# Patient Record
Sex: FEMALE | Race: WHITE
[De-identification: names, ages, dates, MRNs, and addresses within clinical notes are randomized per-mention and may not be internally consistent; named-entity substitution may affect disease eponyms.]

---

## 2021-06-16 ENCOUNTER — HOSPITAL ENCOUNTER (INPATIENT)
Dept: HOSPITAL 12 - ER | Age: 86
LOS: 12 days | Discharge: HOME HEALTH | DRG: 885 | End: 2021-06-28
Attending: INTERNAL MEDICINE
Payer: MEDICARE

## 2021-06-16 VITALS — BODY MASS INDEX: 23.92 KG/M2 | HEIGHT: 62 IN | WEIGHT: 130 LBS

## 2021-06-16 DIAGNOSIS — F01.50: ICD-10-CM

## 2021-06-16 DIAGNOSIS — E78.5: ICD-10-CM

## 2021-06-16 DIAGNOSIS — E44.1: ICD-10-CM

## 2021-06-16 DIAGNOSIS — F33.3: Primary | ICD-10-CM

## 2021-06-16 DIAGNOSIS — E88.09: ICD-10-CM

## 2021-06-16 DIAGNOSIS — F02.80: ICD-10-CM

## 2021-06-16 DIAGNOSIS — D75.89: ICD-10-CM

## 2021-06-16 DIAGNOSIS — Z74.09: ICD-10-CM

## 2021-06-16 DIAGNOSIS — G93.41: ICD-10-CM

## 2021-06-16 DIAGNOSIS — Z86.73: ICD-10-CM

## 2021-06-16 DIAGNOSIS — Z20.822: ICD-10-CM

## 2021-06-16 DIAGNOSIS — G31.9: ICD-10-CM

## 2021-06-16 DIAGNOSIS — N39.0: ICD-10-CM

## 2021-06-16 DIAGNOSIS — M19.011: ICD-10-CM

## 2021-06-16 DIAGNOSIS — E03.9: ICD-10-CM

## 2021-06-16 DIAGNOSIS — N32.81: ICD-10-CM

## 2021-06-16 DIAGNOSIS — F41.9: ICD-10-CM

## 2021-06-16 DIAGNOSIS — Z90.2: ICD-10-CM

## 2021-06-16 DIAGNOSIS — G30.9: ICD-10-CM

## 2021-06-16 DIAGNOSIS — D68.59: ICD-10-CM

## 2021-06-16 LAB
APPEARANCE UR: CLEAR
BILIRUB UR QL STRIP: NEGATIVE
COLOR UR: YELLOW
DEPRECATED SQUAMOUS URNS QL MICRO: (no result) /HPF
GLUCOSE UR STRIP-MCNC: NEGATIVE MG/DL
HGB UR QL STRIP: NEGATIVE
KETONES UR STRIP-MCNC: (no result) MG/DL
LEUKOCYTE ESTERASE UR QL STRIP: (no result)
NITRITE UR QL STRIP: NEGATIVE
PH UR STRIP: 5.5 [PH] (ref 5–8)
RBC #/AREA URNS HPF: (no result) /HPF (ref 0–3)
SP GR UR STRIP: 1.02 (ref 1–1.03)
UROBILINOGEN UR STRIP-MCNC: 0.2 E.U./DL
WBC #/AREA URNS HPF: (no result) /HPF

## 2021-06-16 PROCEDURE — A4663 DIALYSIS BLOOD PRESSURE CUFF: HCPCS

## 2021-06-16 NOTE — NUR
Pt brought in by [address] [facility] from [facility] for medical 
clearance. No SOB or labored breathing, afebrile. No c/o chest pain/pressure. 
Bed in lowest position for safety precautions. Able to follow directions.

## 2021-06-16 NOTE — NUR
Pt. admitted to [facility] , under care of  and Dr. Junior Adams. 

Dx: psychosis, 5150 hold for danger to others. 

Belongs List completed

## 2021-06-17 VITALS — DIASTOLIC BLOOD PRESSURE: 43 MMHG | SYSTOLIC BLOOD PRESSURE: 125 MMHG

## 2021-06-17 VITALS — SYSTOLIC BLOOD PRESSURE: 122 MMHG | DIASTOLIC BLOOD PRESSURE: 36 MMHG

## 2021-06-17 VITALS — SYSTOLIC BLOOD PRESSURE: 115 MMHG | DIASTOLIC BLOOD PRESSURE: 48 MMHG

## 2021-06-17 LAB
CHOLEST SERPL-MCNC: 174 MG/DL (ref ?–200)
GLUCOSE SERPL-MCNC: 110 MG/DL (ref 70–115)
HDLC SERPL-MCNC: 44 MG/DL (ref 40–60)
TRIGL SERPL-MCNC: 138 MG/DL (ref 30–150)

## 2021-06-17 RX ADMIN — ACETAMINOPHEN PRN MG: 325 TABLET ORAL at 21:04

## 2021-06-17 RX ADMIN — ATORVASTATIN CALCIUM SCH MG: 10 TABLET, FILM COATED ORAL at 21:04

## 2021-06-17 NOTE — NUR
RECEIVED REPORT FROM ER NURSE GERRY PT IS ALERT AND ORIENTED X2 HAS A HX OF  
ALZHEIMER'S, TIA,OVERACTIVE BLADDER,HYPERLIPIDEMIA,DEMENTIA,MAJOR DEPRESSION,AND 
HYPOTHYROIDISM.PT WAS TRANSFERRED FROM [facility]  DUE TO ACUTE PSYCHOSIS AND ON 5150 
BECAUSE SHE WAS GOING TO STAB HER .  PT IS ALLERGIC TO SULFA ANTIBIOTIC, ADHESIVE 
TAPE,,ATIVAN, OLANZAPINE,QUETIAPINE.  ONCE PT ARRIVED ON UNIT STARTED FIGHTING STAFF AND 
STATED "I AM NOT STAYING HERE'. DR Fabienne ABREU WAS CALLED ORDERED 5MG OF GEODON IM AND PT IS 
NOW SITTING IN A RECLINER TOLERATED IM INJECTION NOW PT IS SLEEPING NO SIGNS OF DISTRESS PT 
IS CALM NO SIGNS OF AGITATION NOTED.  PT HAS GLASSES.  WILL CONTINUE TO MONITOR FOR SAFETY 
AND CHANGES IN PT BEHAVIOR.

## 2021-06-17 NOTE — NUR
CITLALY Initial Discharge Plan:

Patient resides at home with her  Richi Cedeno ([phone]) [address] [place] [address] . Patient's son/DPOA Channing Cedeno ([phone]) is involved in the 
patient's care. Patient will return home upon discharge. CITLALY will continue to work with 
patient, family, and MD to ensure a safe and proper discharge plan.

## 2021-06-17 NOTE — NUR
Firearms Report:

 completed and submitted a [employer] firearms report for 5150 danger to others 
certifications. A copy of report has been placed in patient chart.

## 2021-06-17 NOTE — NUR
RECEIVED PT CALM AT THIS TIME NO SIGNS OF DISTRESS NOTED PT WAS GIVEN HS MEDICATION NO SIGNS 
OF RESPIRATORY DISTRESS NOTED.  WILL CONTINUE TO MONITOR FOR SAFETY AND OTHER CHANGES PT MAY 
HAVE.

## 2021-06-17 NOTE — NUR
SW Family Contact:



SW spoke with patient's son/DPOA Channing Wilian ([phone]) who is involved in the patient's 
care. Channing provided collateral information and this SW discussed treatment and discharge 
plan. Chnaning will be bringing in the DPOA documents this afternoon.

## 2021-06-17 NOTE — NUR
PATIENT IS NOTED CALM AT THIS TIME. CHEMICAL RESTRAINT WAS EFFECTIVE.

PT WAS GIVEN HER ADVISEMENT AS WELL AS HER BOOKLET FOR "PATIENT RIGHTS IN MENTAL HEALTH 
FACILITIES". [place] IS NOTED TO REFLECT WAS IS WRITTEN IN HER HOLD. SAFETY AND FALL PRECAUTION 
IN PLACE. WILL CONTINUE TO MONITOR,

## 2021-06-17 NOTE — NUR
UR NOTE:



Per GG Intake:



[facility].  Hal Khalil ([phone]) is the Care MGR.  Auth# [id number] 
obtained 14 days approved.

## 2021-06-17 NOTE — NUR
CITLALY Family Meeting:



CITLALY met with patient's son Channing Cedeno ([phone]) and patient's daughter Tierra.tran Snell 
provided the DPOA documents and the copy has been placed in the patient's chart. CITLALY 
discussed treatment plan with both Channing and Tierra as well as medications, therapy, etc. 
Channing requested to speak with Dr. Ramirez. SW endorsed this to Dr. Ramirez.

## 2021-06-18 VITALS — SYSTOLIC BLOOD PRESSURE: 126 MMHG | DIASTOLIC BLOOD PRESSURE: 81 MMHG

## 2021-06-18 VITALS — SYSTOLIC BLOOD PRESSURE: 123 MMHG | DIASTOLIC BLOOD PRESSURE: 72 MMHG

## 2021-06-18 VITALS — DIASTOLIC BLOOD PRESSURE: 55 MMHG | SYSTOLIC BLOOD PRESSURE: 120 MMHG

## 2021-06-18 LAB
ALP SERPL-CCNC: 71 U/L (ref 50–136)
ALT SERPL W/O P-5'-P-CCNC: 15 U/L (ref 14–59)
AST SERPL-CCNC: 11 U/L (ref 15–37)
BILIRUB SERPL-MCNC: 0.3 MG/DL (ref 0.2–1)
BUN SERPL-MCNC: 23 MG/DL (ref 7–18)
CHLORIDE SERPL-SCNC: 108 MMOL/L (ref 98–107)
CO2 SERPL-SCNC: 27 MMOL/L (ref 21–32)
CREAT SERPL-MCNC: 1.1 MG/DL (ref 0.6–1.3)
GLUCOSE SERPL-MCNC: 90 MG/DL (ref 74–106)
HCT VFR BLD AUTO: 35.8 % (ref 31.2–41.9)
MAGNESIUM SERPL-MCNC: 2.3 MG/DL (ref 1.8–2.4)
MCH RBC QN AUTO: 32.6 UUG (ref 24.7–32.8)
MCV RBC AUTO: 98 FL (ref 75.5–95.3)
PHOSPHATE SERPL-MCNC: 2.6 MG/DL (ref 2.5–4.9)
PLATELET # BLD AUTO: 223 K/UL (ref 179–408)
POTASSIUM SERPL-SCNC: 4.3 MMOL/L (ref 3.5–5.1)
TSH SERPL DL<=0.005 MIU/L-ACNC: 2.62 MIU/ML (ref 0.36–3.74)
WS STN SPEC: 6.9 G/DL (ref 6.4–8.2)

## 2021-06-18 RX ADMIN — ACETAMINOPHEN PRN MG: 325 TABLET ORAL at 13:19

## 2021-06-18 RX ADMIN — OXCARBAZEPINE SCH MG: 150 TABLET, FILM COATED ORAL at 09:38

## 2021-06-18 RX ADMIN — OXCARBAZEPINE SCH MG: 150 TABLET, FILM COATED ORAL at 13:19

## 2021-06-18 RX ADMIN — Medication SCH MCG: at 06:21

## 2021-06-18 RX ADMIN — OXCARBAZEPINE SCH MG: 150 TABLET, FILM COATED ORAL at 17:36

## 2021-06-18 RX ADMIN — ATORVASTATIN CALCIUM SCH MG: 10 TABLET, FILM COATED ORAL at 20:40

## 2021-06-18 NOTE — NUR
CITLALY Family Contact:



CITLALY received a two voicemails from patient's son Channing Cedeno ([phone]). SW called Channing 
and spoke with him regarding patient's updated medications. Channing was concerned about patient 
not being ambulatory oe active enough. SW informed that physical therapy has met with 
patient and will be conducting therapy daily.

## 2021-06-18 NOTE — NUR
NURSING ASSISTANT CHECKED ON PT SHE I S COMBATIVE AND  AGITATED  CALLED SECURITY  ASSISTS 
STAFF PUT PT IN GERICHAIR AFTERWARDS. DR. ABREU WAS CALLED SHE ORDERED PT TO HAVE A ONE 
TIME DOSE OF 5MG GEODON IM PT WAS GIVEN DOSAGE BUT STILL COMBATIVE. PT IS SITING WITH AT 
NURSES STATION WILL MONITOR EFFECT OF MEDICATION IN 30. NO SIGNS OF DISTRESS NOTED.

## 2021-06-18 NOTE — NUR
CITLALY Note:



Per family request, CITLALY informed Dr. Ramirez they would like to speak to her and requested 
for a call to Channing patient's son/DPOA ([phone]).

## 2021-06-19 VITALS — SYSTOLIC BLOOD PRESSURE: 120 MMHG | DIASTOLIC BLOOD PRESSURE: 53 MMHG

## 2021-06-19 VITALS — SYSTOLIC BLOOD PRESSURE: 110 MMHG | DIASTOLIC BLOOD PRESSURE: 45 MMHG

## 2021-06-19 VITALS — DIASTOLIC BLOOD PRESSURE: 52 MMHG | SYSTOLIC BLOOD PRESSURE: 115 MMHG

## 2021-06-19 RX ADMIN — OXCARBAZEPINE SCH MG: 150 TABLET, FILM COATED ORAL at 08:55

## 2021-06-19 RX ADMIN — OXCARBAZEPINE SCH MG: 150 TABLET, FILM COATED ORAL at 13:05

## 2021-06-19 RX ADMIN — Medication SCH MCG: at 06:22

## 2021-06-19 RX ADMIN — ATORVASTATIN CALCIUM SCH MG: 10 TABLET, FILM COATED ORAL at 21:10

## 2021-06-19 RX ADMIN — Medication SCH EA: at 21:10

## 2021-06-19 RX ADMIN — OXCARBAZEPINE SCH MG: 150 TABLET, FILM COATED ORAL at 17:41

## 2021-06-19 NOTE — NUR
PATIENTS FAMILY HERE TO VISIT AND ASSISTING HER WITH AMBULATING IN THE HALLWAY WITH THE 
FRONT WHEEL WALKER WITHB SLOW STEADY GAIT COMPLIANT WITH MEDICATIONS AND CARE.

## 2021-06-19 NOTE — NUR
PATIENT ALERT CONFUSED, TALKING ABOUT GANGS AROUND HER, REORIENT PATIENT AND ASSISTED WITH 
TOILETING, PATIENT COOPERATIVE WITH MEDICATIONS AND CARE, CONT TO MONITOR. DR DUPONT HAS 
NEW ORDER OF RISPERDAL 0.25 MG PO PRN HS.

## 2021-06-19 NOTE — NUR
PATIENT IS AWAKE ALERT ORIENTED WITH FORGETFULNESS SHE IS COMPLIANT WITH MEDICATIONS AND 
CARE NO EPISODES OF AUDITORY OR VISUAL HALLUCINATIONS AT THIS TIME WILL CONTINUE TO PROVIDE 
SAFE AND THERAPEUTIC ENVIRONMENT AT ALL TIMES.

## 2021-06-19 NOTE — NUR
RECEIVED PT LYING ON BED PT IS COMPLIANT WITH MEDICATION BUT AFTERWARDS CONTINUE TO GET OUT 
OF BED FOR PT SAFETY PUT ON A GERICHAIR. PT IS CONFUSED STATES"i HAVE TO GO HOME COOK MY 
 DINNER AND GET HOME WITH THE KIDS" PT VOICE IS ESCALATING CALLED DR ABREU FOR 
MEDICATION.  WILL CONTINUE TO MONITOR.

## 2021-06-20 VITALS — SYSTOLIC BLOOD PRESSURE: 149 MMHG | DIASTOLIC BLOOD PRESSURE: 49 MMHG

## 2021-06-20 VITALS — SYSTOLIC BLOOD PRESSURE: 111 MMHG | DIASTOLIC BLOOD PRESSURE: 47 MMHG

## 2021-06-20 VITALS — DIASTOLIC BLOOD PRESSURE: 41 MMHG | SYSTOLIC BLOOD PRESSURE: 119 MMHG

## 2021-06-20 RX ADMIN — Medication SCH MCG: at 06:02

## 2021-06-20 RX ADMIN — MEMANTINE HYDROCHLORIDE SCH MG: 10 TABLET ORAL at 08:32

## 2021-06-20 RX ADMIN — Medication SCH EA: at 20:05

## 2021-06-20 RX ADMIN — MAGNESIUM HYDROXIDE PRN ML: 400 SUSPENSION ORAL at 10:34

## 2021-06-20 RX ADMIN — Medication SCH MG: at 08:32

## 2021-06-20 RX ADMIN — ATORVASTATIN CALCIUM SCH MG: 10 TABLET, FILM COATED ORAL at 20:09

## 2021-06-20 RX ADMIN — OXCARBAZEPINE SCH MG: 150 TABLET, FILM COATED ORAL at 08:32

## 2021-06-20 RX ADMIN — Medication SCH EA: at 08:33

## 2021-06-20 RX ADMIN — MEMANTINE HYDROCHLORIDE SCH MG: 10 TABLET ORAL at 20:09

## 2021-06-20 RX ADMIN — OXCARBAZEPINE SCH MG: 150 TABLET, FILM COATED ORAL at 16:14

## 2021-06-20 RX ADMIN — OXCARBAZEPINE SCH MG: 150 TABLET, FILM COATED ORAL at 12:36

## 2021-06-20 NOTE — NUR
ASSISTED PATIENT TO THE BATHROOM WITH THE FRONT WHEEL WALKER PATIENT HAS UNSTEADY GAIT ALERT 
BUT IS DISORIENTED NEEDED CONSTANT QUES TO STAY INSIDE THE WALKER AND TO STAND STRAIGHT 
DAUGHTER IS AT THE BEDSIDE PLACED BACK INTO THE LOLITA CHAIR MADE COMFORTABLE WILL CONTINUE TO 
OBSERVE.

## 2021-06-20 NOTE — NUR
Pt was attempt get out bed during the night md was called and afterwards pt took vistaril 
25mg but refused ambien will endorse to am nurse.

## 2021-06-20 NOTE — NUR
PATIENT MEDICATED WITH MILK OF MAGNESIA AS THERE IS NO DOCUMENTATION FOR A BOWEL MOVEMENT 
AND PATIENT IS UNABLE TO REMEMBER WHEN HE HAD A BM.

## 2021-06-20 NOTE — NUR
ASA/Dipyridamole (pt's own med) received from Pharmacy. Pt refused HS meds despite being 
offered multiple times, said "I am going to take it when I get home."

## 2021-06-21 VITALS — SYSTOLIC BLOOD PRESSURE: 132 MMHG | DIASTOLIC BLOOD PRESSURE: 54 MMHG

## 2021-06-21 VITALS — DIASTOLIC BLOOD PRESSURE: 50 MMHG | SYSTOLIC BLOOD PRESSURE: 136 MMHG

## 2021-06-21 RX ADMIN — MEMANTINE HYDROCHLORIDE SCH MG: 10 TABLET ORAL at 20:07

## 2021-06-21 RX ADMIN — OXCARBAZEPINE SCH MG: 150 TABLET, FILM COATED ORAL at 13:11

## 2021-06-21 RX ADMIN — OXCARBAZEPINE SCH MG: 150 TABLET, FILM COATED ORAL at 09:19

## 2021-06-21 RX ADMIN — Medication SCH EA: at 09:20

## 2021-06-21 RX ADMIN — Medication SCH MG: at 09:19

## 2021-06-21 RX ADMIN — Medication SCH MCG: at 06:22

## 2021-06-21 RX ADMIN — MEMANTINE HYDROCHLORIDE SCH MG: 10 TABLET ORAL at 09:18

## 2021-06-21 RX ADMIN — ATORVASTATIN CALCIUM SCH MG: 10 TABLET, FILM COATED ORAL at 20:07

## 2021-06-21 RX ADMIN — OXCARBAZEPINE SCH MG: 150 TABLET, FILM COATED ORAL at 17:23

## 2021-06-21 RX ADMIN — Medication SCH EA: at 20:07

## 2021-06-21 NOTE — NUR
CITLALY PC Hearing: 

Patient had 5250 probable cause hearing today and it was upheld for grave disability and 
danger to others.

## 2021-06-22 VITALS — DIASTOLIC BLOOD PRESSURE: 42 MMHG | SYSTOLIC BLOOD PRESSURE: 108 MMHG

## 2021-06-22 VITALS — DIASTOLIC BLOOD PRESSURE: 50 MMHG | SYSTOLIC BLOOD PRESSURE: 105 MMHG

## 2021-06-22 VITALS — DIASTOLIC BLOOD PRESSURE: 52 MMHG | SYSTOLIC BLOOD PRESSURE: 106 MMHG

## 2021-06-22 RX ADMIN — OXCARBAZEPINE SCH MG: 150 TABLET, FILM COATED ORAL at 16:35

## 2021-06-22 RX ADMIN — Medication SCH MG: at 08:32

## 2021-06-22 RX ADMIN — ASPIRIN SCH MG: 81 TABLET, CHEWABLE ORAL at 08:31

## 2021-06-22 RX ADMIN — OXCARBAZEPINE SCH MG: 150 TABLET, FILM COATED ORAL at 13:27

## 2021-06-22 RX ADMIN — Medication SCH EA: at 08:33

## 2021-06-22 RX ADMIN — MEMANTINE HYDROCHLORIDE SCH MG: 10 TABLET ORAL at 20:41

## 2021-06-22 RX ADMIN — Medication SCH EA: at 20:41

## 2021-06-22 RX ADMIN — OXCARBAZEPINE SCH MG: 150 TABLET, FILM COATED ORAL at 08:32

## 2021-06-22 RX ADMIN — Medication SCH MCG: at 06:06

## 2021-06-22 RX ADMIN — ATORVASTATIN CALCIUM SCH MG: 10 TABLET, FILM COATED ORAL at 20:41

## 2021-06-22 RX ADMIN — MEMANTINE HYDROCHLORIDE SCH MG: 10 TABLET ORAL at 08:32

## 2021-06-22 NOTE — NUR
CITLALY Family Contact:



CITLALY spoke with patient's son Channing Cedeno ([phone]) and rediscussed the medications 
patient is taking. Channing had some concerns and this  provided Delores Castro [facility] 
office number.

## 2021-06-22 NOTE — NUR
CITLALY Family Contact:



CITLALY met with patient's son Channing Cedeno ([phone]) and discussed updated treatment plan. 
SW provided updated medication list and patient's behaviors. SW provided information on 
Physical therapy treatments.

## 2021-06-22 NOTE — NUR
UR NOTE:





[facility].  Gail (P [phone] F [phone]) Auth# [id number] requested 
updated clinicals today. SW faxed updated clinicals including history and physical, 
medication list and labs, progress notes.

## 2021-06-22 NOTE — NUR
GPS: Remain calm and cooperative with meds and care. no agitation noted. watching tv in day 
room at this time.

## 2021-06-23 VITALS — DIASTOLIC BLOOD PRESSURE: 50 MMHG | SYSTOLIC BLOOD PRESSURE: 116 MMHG

## 2021-06-23 VITALS — SYSTOLIC BLOOD PRESSURE: 117 MMHG | DIASTOLIC BLOOD PRESSURE: 30 MMHG

## 2021-06-23 VITALS — SYSTOLIC BLOOD PRESSURE: 114 MMHG | DIASTOLIC BLOOD PRESSURE: 47 MMHG

## 2021-06-23 RX ADMIN — MEMANTINE HYDROCHLORIDE SCH MG: 10 TABLET ORAL at 20:56

## 2021-06-23 RX ADMIN — Medication SCH MG: at 10:56

## 2021-06-23 RX ADMIN — Medication SCH EA: at 20:58

## 2021-06-23 RX ADMIN — Medication SCH EA: at 10:54

## 2021-06-23 RX ADMIN — Medication SCH MCG: at 06:24

## 2021-06-23 RX ADMIN — OXCARBAZEPINE SCH MG: 150 TABLET, FILM COATED ORAL at 18:22

## 2021-06-23 RX ADMIN — OXCARBAZEPINE SCH MG: 150 TABLET, FILM COATED ORAL at 12:56

## 2021-06-23 RX ADMIN — ASPIRIN SCH MG: 81 TABLET, CHEWABLE ORAL at 10:55

## 2021-06-23 RX ADMIN — OXCARBAZEPINE SCH MG: 150 TABLET, FILM COATED ORAL at 10:55

## 2021-06-23 RX ADMIN — ATORVASTATIN CALCIUM SCH MG: 10 TABLET, FILM COATED ORAL at 20:56

## 2021-06-23 RX ADMIN — MEMANTINE HYDROCHLORIDE SCH MG: 10 TABLET ORAL at 10:56

## 2021-06-23 NOTE — NUR
Received patient in her room, med compliant, patient pleasant upon approach. Patient mostly 
stays in her room. Interacts with staff upon approach. Patient will remain in a psych 
facility for further evaluation and treatment.

## 2021-06-23 NOTE — NUR
SW Family Contact:



SW and Dr. Bush met with patient's son Channing Cedeno ([phone]) and discussed the 
treatment plan, discharge plan, patient's current condition in dept. Dr. Bush provided 
education regarding the above. Dr. Bush and this SW listened to Channing's concerns.

## 2021-06-23 NOTE — NUR
CITLALY Family Contact:



CITLALY and Dr. Ramirez spoke with patient's son Channing Cedeno ([phone]). Dr. Ramirez listened 
and addressed some of his concerns regarding medications, treatment plan, and discharge 
plan. Dr. Ramirez informed that the internist can provide insight if patient will require 
physial therapy at home. Dr. Ramirez addressed patient will need to follow up with her 
outpatient psychiatrist upon discharge. Dr. Ramirez discussed in dept the side effects of 
the current medications and the patient's current stable condition. Channing was understanding. 
Also discussed possible discharge home once stable this week.

## 2021-06-24 VITALS — SYSTOLIC BLOOD PRESSURE: 135 MMHG | DIASTOLIC BLOOD PRESSURE: 51 MMHG

## 2021-06-24 VITALS — SYSTOLIC BLOOD PRESSURE: 130 MMHG | DIASTOLIC BLOOD PRESSURE: 45 MMHG

## 2021-06-24 LAB
ALP SERPL-CCNC: 82 U/L (ref 50–136)
ALT SERPL W/O P-5'-P-CCNC: 18 U/L (ref 14–59)
AST SERPL-CCNC: 17 U/L (ref 15–37)
BILIRUB SERPL-MCNC: 0.1 MG/DL (ref 0.2–1)
BUN SERPL-MCNC: 18 MG/DL (ref 7–18)
CHLORIDE SERPL-SCNC: 101 MMOL/L (ref 98–107)
CO2 SERPL-SCNC: 27 MMOL/L (ref 21–32)
CREAT SERPL-MCNC: 0.9 MG/DL (ref 0.6–1.3)
GLUCOSE SERPL-MCNC: 104 MG/DL (ref 74–106)
HCT VFR BLD AUTO: 33.4 % (ref 31.2–41.9)
MAGNESIUM SERPL-MCNC: 2.2 MG/DL (ref 1.8–2.4)
MCH RBC QN AUTO: 32.7 UUG (ref 24.7–32.8)
MCV RBC AUTO: 97.4 FL (ref 75.5–95.3)
PLATELET # BLD AUTO: 224 K/UL (ref 179–408)
POTASSIUM SERPL-SCNC: 4 MMOL/L (ref 3.5–5.1)
WS STN SPEC: 7 G/DL (ref 6.4–8.2)

## 2021-06-24 RX ADMIN — Medication SCH MG: at 08:31

## 2021-06-24 RX ADMIN — Medication SCH EA: at 20:50

## 2021-06-24 RX ADMIN — Medication SCH EA: at 08:36

## 2021-06-24 RX ADMIN — MEMANTINE HYDROCHLORIDE SCH MG: 10 TABLET ORAL at 08:31

## 2021-06-24 RX ADMIN — Medication SCH MCG: at 06:12

## 2021-06-24 RX ADMIN — MEMANTINE HYDROCHLORIDE SCH MG: 10 TABLET ORAL at 20:48

## 2021-06-24 RX ADMIN — ASPIRIN SCH MG: 81 TABLET, CHEWABLE ORAL at 08:31

## 2021-06-24 RX ADMIN — OXCARBAZEPINE SCH MG: 150 TABLET, FILM COATED ORAL at 08:31

## 2021-06-24 RX ADMIN — ATORVASTATIN CALCIUM SCH MG: 10 TABLET, FILM COATED ORAL at 20:48

## 2021-06-24 NOTE — NUR
GPS: Nursing Notes: Thought Disorder:

Patient is awake and responding to her name, confused, disoriented, paranoid behavior, 
resistant with nursing care, poor impulse control, needs constant redirections, reoriented 
and redirected during shift, ambulatory with assistance/fww, believes that we are trying to 
poison her, loud anxious affect, unable to formulate a viable plan for self care, continue 
with treatment plan.

## 2021-06-25 VITALS — DIASTOLIC BLOOD PRESSURE: 55 MMHG | SYSTOLIC BLOOD PRESSURE: 125 MMHG

## 2021-06-25 VITALS — DIASTOLIC BLOOD PRESSURE: 63 MMHG | SYSTOLIC BLOOD PRESSURE: 125 MMHG

## 2021-06-25 LAB
APPEARANCE UR: CLEAR
BILIRUB UR QL STRIP: NEGATIVE
COLOR UR: YELLOW
DEPRECATED SQUAMOUS URNS QL MICRO: (no result) /HPF
GLUCOSE UR STRIP-MCNC: NEGATIVE MG/DL
HGB UR QL STRIP: NEGATIVE
KETONES UR STRIP-MCNC: NEGATIVE MG/DL
LEUKOCYTE ESTERASE UR QL STRIP: (no result)
NITRITE UR QL STRIP: NEGATIVE
PH UR STRIP: 6.5 [PH] (ref 5–8)
RBC #/AREA URNS HPF: (no result) /HPF (ref 0–3)
SP GR UR STRIP: 1.02 (ref 1–1.03)
UROBILINOGEN UR STRIP-MCNC: 0.2 E.U./DL
WBC #/AREA URNS HPF: (no result) /HPF
WBC #/AREA URNS HPF: (no result) /HPF (ref 0–3)

## 2021-06-25 RX ADMIN — MEMANTINE HYDROCHLORIDE SCH MG: 10 TABLET ORAL at 08:29

## 2021-06-25 RX ADMIN — MEMANTINE HYDROCHLORIDE SCH MG: 10 TABLET ORAL at 20:29

## 2021-06-25 RX ADMIN — MIRTAZAPINE SCH MG: 15 TABLET, FILM COATED ORAL at 20:29

## 2021-06-25 RX ADMIN — Medication SCH MG: at 08:29

## 2021-06-25 RX ADMIN — Medication SCH EA: at 20:30

## 2021-06-25 RX ADMIN — Medication SCH EA: at 08:32

## 2021-06-25 RX ADMIN — ASPIRIN SCH MG: 81 TABLET, CHEWABLE ORAL at 08:29

## 2021-06-25 RX ADMIN — ATORVASTATIN CALCIUM SCH MG: 10 TABLET, FILM COATED ORAL at 20:29

## 2021-06-25 RX ADMIN — Medication SCH MCG: at 06:01

## 2021-06-25 RX ADMIN — HALOPERIDOL SCH MG: 0.5 TABLET ORAL at 17:02

## 2021-06-25 RX ADMIN — HALOPERIDOL SCH MG: 0.5 TABLET ORAL at 15:09

## 2021-06-25 NOTE — NUR
UR NOTE:



CITLALY faxed patient's updated clinicals for review to [facility].  Gail (P 
[phone] F [phone]) Auth# [id number] and Joycelyn ([phone]).

## 2021-06-25 NOTE — NUR
CITLALY Family Contact:



CITLALY and Dr. Ramirez spoke with patient's son Channing Cedeno ([phone]). Dr. Ramirez listened 
and addressed some concerns Channing had regarding medications and the treatment plan. Dr. Ramirez discussed the updated treatment plan and Channing is agreeable.

## 2021-06-25 NOTE — NUR
UR NOTE:



SW spoke with [facility].  Lexi (P [phone] F [phone]) Auth# 
[id number] who confirmed patient is authorized.

## 2021-06-25 NOTE — NUR
RECEIVED REPORT FROM AM NURSE JESSICA PT IN ROOM NO  SIGNS OF DISTRESSED NOTED BUT GOT OF BED 
AND WAS PLACED ON A GERICHAIR AFTERWARDS GIVEN A SHOWER AND TOOK HS MEDICATION NO SIGNS OF 
ADVERSE MEDICATION NOTED.  WILL CONTINUE TO MONITOR WILL GIVE UPDATE TO AM NURSE.

## 2021-06-26 VITALS — SYSTOLIC BLOOD PRESSURE: 98 MMHG | DIASTOLIC BLOOD PRESSURE: 52 MMHG

## 2021-06-26 VITALS — SYSTOLIC BLOOD PRESSURE: 117 MMHG | DIASTOLIC BLOOD PRESSURE: 49 MMHG

## 2021-06-26 RX ADMIN — MIRTAZAPINE SCH MG: 15 TABLET, FILM COATED ORAL at 20:17

## 2021-06-26 RX ADMIN — Medication SCH EA: at 10:00

## 2021-06-26 RX ADMIN — ATORVASTATIN CALCIUM SCH MG: 10 TABLET, FILM COATED ORAL at 20:17

## 2021-06-26 RX ADMIN — Medication SCH EA: at 20:19

## 2021-06-26 RX ADMIN — MEMANTINE HYDROCHLORIDE SCH MG: 10 TABLET ORAL at 10:00

## 2021-06-26 RX ADMIN — HALOPERIDOL SCH MG: 0.5 TABLET ORAL at 12:21

## 2021-06-26 RX ADMIN — MEMANTINE HYDROCHLORIDE SCH MG: 10 TABLET ORAL at 20:17

## 2021-06-26 RX ADMIN — HALOPERIDOL SCH MG: 0.5 TABLET ORAL at 16:41

## 2021-06-26 RX ADMIN — ASPIRIN SCH MG: 81 TABLET, CHEWABLE ORAL at 10:00

## 2021-06-26 RX ADMIN — Medication SCH MCG: at 06:17

## 2021-06-26 NOTE — NUR
GPS: Remain calm and cooperative with care.  compliant with am meds. no agitation noted. 
assisted with adl's. no c/o pain or discomfort at this time. slept 7.30 hrs through the 
night. resting in bed comfortably.

## 2021-06-27 VITALS — DIASTOLIC BLOOD PRESSURE: 47 MMHG | SYSTOLIC BLOOD PRESSURE: 118 MMHG

## 2021-06-27 VITALS — DIASTOLIC BLOOD PRESSURE: 64 MMHG | SYSTOLIC BLOOD PRESSURE: 112 MMHG

## 2021-06-27 VITALS — SYSTOLIC BLOOD PRESSURE: 104 MMHG | DIASTOLIC BLOOD PRESSURE: 51 MMHG

## 2021-06-27 RX ADMIN — HALOPERIDOL SCH MG: 0.5 TABLET ORAL at 12:18

## 2021-06-27 RX ADMIN — Medication SCH EA: at 20:29

## 2021-06-27 RX ADMIN — MEMANTINE HYDROCHLORIDE SCH MG: 10 TABLET ORAL at 20:27

## 2021-06-27 RX ADMIN — Medication SCH MCG: at 06:14

## 2021-06-27 RX ADMIN — MEMANTINE HYDROCHLORIDE SCH MG: 10 TABLET ORAL at 10:23

## 2021-06-27 RX ADMIN — MIRTAZAPINE SCH MG: 15 TABLET, FILM COATED ORAL at 20:27

## 2021-06-27 RX ADMIN — ASPIRIN SCH MG: 81 TABLET, CHEWABLE ORAL at 10:23

## 2021-06-27 RX ADMIN — ATORVASTATIN CALCIUM SCH MG: 10 TABLET, FILM COATED ORAL at 20:27

## 2021-06-27 RX ADMIN — Medication SCH EA: at 10:23

## 2021-06-27 RX ADMIN — MAGNESIUM HYDROXIDE PRN ML: 400 SUSPENSION ORAL at 17:43

## 2021-06-27 RX ADMIN — ACETAMINOPHEN PRN MG: 325 TABLET ORAL at 16:13

## 2021-06-27 RX ADMIN — HALOPERIDOL SCH MG: 0.5 TABLET ORAL at 17:26

## 2021-06-28 VITALS — SYSTOLIC BLOOD PRESSURE: 104 MMHG | DIASTOLIC BLOOD PRESSURE: 51 MMHG

## 2021-06-28 RX ADMIN — MEMANTINE HYDROCHLORIDE SCH MG: 10 TABLET ORAL at 08:24

## 2021-06-28 RX ADMIN — HALOPERIDOL SCH MG: 0.5 TABLET ORAL at 12:29

## 2021-06-28 RX ADMIN — Medication SCH EA: at 08:25

## 2021-06-28 RX ADMIN — Medication SCH MCG: at 06:16

## 2021-06-28 RX ADMIN — ASPIRIN SCH MG: 81 TABLET, CHEWABLE ORAL at 08:23

## 2021-06-28 NOTE — NUR
This writer called in patient's prescriptions to [facility] 
([phone]). Spoke with Patrick, Pharmacist. Prescriptions given for Haldol 0.5 mg PO at 
1300 and 1700, and Remeron 7.5 mg PO Q HS.

## 2021-06-28 NOTE — NUR
UR NOTE:



CITLALY faxed [facility].  Lexi Mayorga (P [phone] F [phone]) Auth# 
[id number] discharged clinicals and request for Home Health Services.

## 2021-06-28 NOTE — NUR
GPS: Nursing Notes: Discharge Notes:

Patient is awake and responding to her name, cooperative with nursing care, compliant with 
her medications, participating in therapeutic groups, following staff directions, denies 
SI/HI, denies AH/VH, denies pain or discomfort at this time, denies SOB, discharge home with 
her  - Richi Cedeno and a daily caregiver at [id number] [place] [address]. Patient's son/DPOA - hCanning Cedeno [phone] picked up the patient, transported 
patient home via private vehicle, patient took all her belongings with her. Physician's 
order for Home Health was fax by  to [facility]. Patient will be 
following up with her psychiatrist Dr. Siddharth Basurto [address] [place] 
[address] (P [phone] F [phone]) and has an appointment scheduled on 6/28/21 2PM. 
Patient will be following up with her primary physician Dr. Ernie Sanders [address] [facility] 
[address], [place] [address] (P [phone] F [phone]) and has an appointment 
scheduled on 06/29/21 at 11:40AM.

## 2021-06-28 NOTE — NUR
Discharge Note:



Patient will be discharged home [address] [place] [address]. Patients 
son/DPOA Channing Cedeno ([phone]) will be picking up the patient today at 12PM. Patient 
has a daily caregiver at home and lives with her , Richi Cedeno. Patient presents 
alert and oriented x2 and is aware and agreeable with discharge plans. Pt denies suicidal or 
homicidal ideation. Pt presents with euthymic mood and congruent affect. Patient will be 
following up with her psychiatrist Dr. Siddharth Basurto [address] [place] 
[address] (P [phone] F [phone]) and has an appointment scheduled on 6/28/21 2PM. 
Patient will be following up with her primary physician Dr. Ernie Sanders [address] [facility] 
[address], [place] [address] (P [phone] F [phone]) and has an appointment 
scheduled on 06/29/21 at 11:40AM.

## 2021-06-28 NOTE — NUR
This writer called and spoke with patient's , Richi Cedeno, to confirm that patient 
has medical medications at home.  confirmed patient just got refills and has 
medications available. This writer also spoke with patient's son, Channing Cedeno, who stated 
that he will be picking up patient between [phone], and he also confirmed she has 
medications available at home. Son requested that psychotropic medications be called into 
[facility].